# Patient Record
Sex: MALE | Race: BLACK OR AFRICAN AMERICAN | NOT HISPANIC OR LATINO | URBAN - METROPOLITAN AREA
[De-identification: names, ages, dates, MRNs, and addresses within clinical notes are randomized per-mention and may not be internally consistent; named-entity substitution may affect disease eponyms.]

---

## 2022-11-04 ENCOUNTER — EMERGENCY (EMERGENCY)
Facility: HOSPITAL | Age: 42
LOS: 1 days | Discharge: HOME | End: 2022-11-04
Attending: STUDENT IN AN ORGANIZED HEALTH CARE EDUCATION/TRAINING PROGRAM
Payer: COMMERCIAL

## 2022-11-04 VITALS
WEIGHT: 139.99 LBS | DIASTOLIC BLOOD PRESSURE: 78 MMHG | HEART RATE: 73 BPM | TEMPERATURE: 98 F | SYSTOLIC BLOOD PRESSURE: 136 MMHG | HEIGHT: 70 IN | OXYGEN SATURATION: 99 % | RESPIRATION RATE: 16 BRPM

## 2022-11-04 PROCEDURE — 99283 EMERGENCY DEPT VISIT LOW MDM: CPT | Mod: 25

## 2022-11-04 PROCEDURE — 99284 EMERGENCY DEPT VISIT MOD MDM: CPT

## 2022-11-04 PROCEDURE — 96372 THER/PROPH/DIAG INJ SC/IM: CPT

## 2022-11-04 PROCEDURE — 99053 MED SERV 10PM-8AM 24 HR FAC: CPT

## 2022-11-04 RX ORDER — ACETAMINOPHEN 500 MG
650 TABLET ORAL ONCE
Refills: 0 | Status: COMPLETED | OUTPATIENT
Start: 2022-11-04 | End: 2022-11-04

## 2022-11-04 RX ORDER — KETOROLAC TROMETHAMINE 30 MG/ML
30 SYRINGE (ML) INJECTION ONCE
Refills: 0 | Status: DISCONTINUED | OUTPATIENT
Start: 2022-11-04 | End: 2022-11-04

## 2022-11-04 RX ADMIN — Medication 650 MILLIGRAM(S): at 02:54

## 2022-11-04 RX ADMIN — Medication 30 MILLIGRAM(S): at 05:02

## 2022-11-04 NOTE — ED PROVIDER NOTE - PHYSICAL EXAMINATION
Vital Signs Reviewed  GEN: Comfortable, NAD  HEENT: Carotid pulses equal and nml with no tenderness over the vasculature, no C-spine TTP, no neck swelling. NCAT, MMM, Neck Supple  RESP: CTAB, No rales/rhonchi/wheezing  CV: RRR, S1S2, No murmurs  ABD: No TTP, ND, No masses  Extrem/Skin: Equal pulses bilat, No cyanosis/edema/rashes  Neuro: AAOx3, CNs Grossly Intact, Nml coordinating mvmts, Equal strength/sensation in all extremities bilat, No Pronator Drift, Nml Gait

## 2022-11-04 NOTE — ED PROVIDER NOTE - PATIENT PORTAL LINK FT
You can access the FollowMyHealth Patient Portal offered by [facility] by registering at the following website: [email or web address]. By joining [facility] [employer]’s FollowMyHealth portal, you will also be able to view your health information using other applications (apps) compatible with our system.

## 2022-11-04 NOTE — ED ADULT NURSE NOTE - EXTENSIONS OF SELF_ADULT
The staff and providers of Pain Management would like to THANK YOU!  Thank you for utilizing the [facility] and [facility] as your healthcare provider. Our Goal is to always provide you with the best of care and we continue to look for better ways to improve the service we provide you.    You may receive a survey in the mail with questions specific to your encounter with our clinic. Should you receive a survey, please take a few minutes to rate your experience with your visit. Our providers and staff value your opinions and insights and thank you in advance for your time and interest.    We look forward to working with you at your next visit and THANK YOU, again, for choosing us to be your care team.  _______________________________________________________________    Please call [facility] at [facility] in [place] if you have any questions about the treatment plan or response to treatment during normal business hours. Our phone number is [phone].     For medication refills, please call or have your pharmacy fax a refill request to [phone]. Please allow at least 2 business days for all refill requests.     THERAPY ORDERS:  · If orders for [facility] services, including: Physical or Occupational therapy, were placed  today:  · Please allow 3 business days for our Rehab Scheduling Department to contact you in regards to proper location and available appointment times.   · If you have not received a phone call regarding scheduling within 3 business days please contact our office.    I understand things come up that cannot be avoided. I ask that you call my office at least 24 hours before your appointment if you need to cancel. This allows me to see another patient who needs care. Our phone number is [phone].     ________________________________________________________________    Call 911 if you are having a medical emergency that needs  immediate attention. Never wait on hold for a nurse/provider/staff or leave a message about a medical emergency.    ________________________________________________________________    Please keep in mind, as discussed that improvement in chronic pain is to be likely gradual, with very small changes over a long period of time. The overall realistic goal is not to be pain free, but to have decreased lifestyle impairment and the best possible quality of life. Realistically, a decrease in pain of 30% with an improvement in function by 30% is our goal while working together.    We also discussed that daily non-opioid pain medications can take 6-8 weeks to see best effect when started and/or increased.    Please dispose of your unused medication properly, recommend a drug take back center at a local pharmacy or area law enforcement agency.   ________________________________________________________________    You have loss of bowel or bladder function, numbness and/or tingling in groin where you would sit on a horse saddle, trouble walking, or are not acting normal-you should get help immediately by calling 911 and/or going to the Emergency Department.   ________________________________________________________________    Medication Instructions:    Gabapentin (Neurontin)  Continue 800mg 3 times per day    This medication will work as a whole by reducing the activity of hyperactive nerves; since pain nerves become hyperactive in chronic pain (through neuroplasticity), they become sensitive to the action of this type of medication.    Side effects of Gabapentin (Neurontin):  Sedation (often fades in about a month), Vertigo/dizziness (usually fades in about a week), Cognitive dysfunction, Weight gain, Possible mood changes      Baclofen (Lioresal)   Tablets are 20mg. May take 1tablet (20mg) 3 times per day as needed  for muscle spasms/discomfort.    Possible Side Effects of Baclofen:  Dizziness or  drowsiness      Non-steroidal anti-inflammatories (NSAIDS)  Celebrex: 100mg 2 times daily as needed    Possible Side Effects:  · Unusual bleeding, bruising  • Skin rash or problem or allergic reaction  • Severe stomach pain, vomiting blood or material that looks like coffee grounds, bloody or black, tarry stools     Possible Risks:  · Renal (kidney) toxicity  · Gastrointestinal (stomach, intestines) adverse events including bleeding, ulceration, perforation  · Cardiovascular events, including myocardial infarction and stroke-(which can be fatal).     Do NOT use if you are using an anticoagulant (blood thinner).    NEVER take more than one of the following medications at the same time:  Ibuprofen (Motrin, Advil), Naproxen (Aleve), Meloxicam (Mobic), Diclofenac (Voltaran, Cataflam), Celecoxib (Celebrex).      · Keep your medication in a lockbox/fire safe and take only as directed.  · Bring your pain medication to every appointment.    Physical Therapy:  · Must do home stretching and strengthening exercises as ordered by Physical Therapy.    Other:  · Recommend Qi Gong or Gómez chi  · Recommend Qi Gong Exercises. Consider looking up [place] on You Tube, 20minute gentle exercises.  · Recommend working with a Chiropractor  · Recommend addition of acupuncture  · Recommend working with a massage therapist for myofascial release  · Recommend addition of TENS unit OR zynex nexwave Estim  · Recommend addition of yoga  · Warm your muscles for at least 10-15 minutes. To massage muscles, put tennis ball in a nylon stocking. Position ball against a wall. Lean back into the ball and move body to massage the area. You should feel some pressure, but not pain while you are doing this. Treat each area for 5 minutes 3 times per day. See Self-Care for Trigger Points in the Muscles (Myofascial Pain) handout for more specific information.     Life Style:  · Drink at least 64 oz of fluids a day that do not have caffeine or artificial  sweeteners UNLESS you are on a fluid restriction due to health concerns, then follow your provider's instructions.  · Eat 3 meals a day plus snacks.  · Get 8-10 hours of sleep a night (NO daytime naps). Good sleep hygiene (lights out, relaxation, keep a routine, go to bed at same time, wake at same time).   · No electronics (phones, tv, computer) in bed with you.     · Exercise 30-60 minutes each day 3-4 days a week.  · Good weight management.  · Good stress management.  · Use relaxation techniques and mindful meditation to help. Cognitive behavioral therapy with a mental health provider is a well known way to help.    Call provider with questions or if:   · The medicines are not working.   · The pain get worse.   · New sym;ptoms develop.   · You have loss of bowel or bladder function, numbness and/or tingling in groin where you would sit on a horse saddle, trouble walking, or are not acting normal-you should get help immediately by calling 911 and/or going to the Emergency Department.                None
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
